# Patient Record
Sex: MALE | Race: WHITE | Employment: FULL TIME | URBAN - METROPOLITAN AREA
[De-identification: names, ages, dates, MRNs, and addresses within clinical notes are randomized per-mention and may not be internally consistent; named-entity substitution may affect disease eponyms.]

---

## 2023-11-19 ENCOUNTER — HOSPITAL ENCOUNTER (OUTPATIENT)
Age: 43
Discharge: HOME | End: 2023-11-19

## 2023-11-19 VITALS
DIASTOLIC BLOOD PRESSURE: 87 MMHG | SYSTOLIC BLOOD PRESSURE: 161 MMHG | OXYGEN SATURATION: 97 % | RESPIRATION RATE: 18 BRPM | HEART RATE: 86 BPM | TEMPERATURE: 97 F

## 2023-11-19 DIAGNOSIS — T78.40XA ALLERGIC REACTION, INITIAL ENCOUNTER: Primary | ICD-10-CM

## 2023-11-19 DIAGNOSIS — R03.0 ELEVATED BLOOD PRESSURE READING: ICD-10-CM

## 2023-11-19 RX ORDER — SODIUM CHLORIDE 9 MG/ML
1000 INJECTION, SOLUTION INTRAVENOUS ONCE
Status: COMPLETED | OUTPATIENT
Start: 2023-11-19 | End: 2023-11-19

## 2023-11-19 RX ORDER — METHYLPREDNISOLONE SODIUM SUCCINATE 125 MG/2ML
125 INJECTION, POWDER, LYOPHILIZED, FOR SOLUTION INTRAMUSCULAR; INTRAVENOUS ONCE
Status: COMPLETED | OUTPATIENT
Start: 2023-11-19 | End: 2023-11-19

## 2023-11-19 RX ORDER — EPINEPHRINE 0.3 MG/.3ML
0.3 INJECTION SUBCUTANEOUS AS NEEDED
Qty: 1 EACH | Refills: 0 | Status: SHIPPED | OUTPATIENT
Start: 2023-11-19

## 2023-11-19 RX ORDER — DIPHENHYDRAMINE HCL 25 MG
CAPSULE ORAL
Qty: 40 CAPSULE | Refills: 0 | Status: SHIPPED | OUTPATIENT
Start: 2023-11-19 | End: 2023-11-23

## 2023-11-19 RX ORDER — PREDNISONE 20 MG/1
40 TABLET ORAL DAILY
Qty: 10 TABLET | Refills: 0 | Status: SHIPPED | OUTPATIENT
Start: 2023-11-19 | End: 2023-11-24

## 2023-11-19 RX ORDER — LORATADINE 10 MG/1
10 TABLET ORAL DAILY
Qty: 10 TABLET | Refills: 0 | Status: SHIPPED | OUTPATIENT
Start: 2023-11-19 | End: 2023-11-29

## 2023-11-19 RX ORDER — DIPHENHYDRAMINE HYDROCHLORIDE 50 MG/ML
25 INJECTION INTRAMUSCULAR; INTRAVENOUS ONCE
Status: COMPLETED | OUTPATIENT
Start: 2023-11-19 | End: 2023-11-19

## 2023-11-19 RX ORDER — FAMOTIDINE 20 MG/1
20 TABLET, FILM COATED ORAL ONCE
Status: COMPLETED | OUTPATIENT
Start: 2023-11-19 | End: 2023-11-19

## 2023-11-19 RX ORDER — FAMOTIDINE 20 MG/1
20 TABLET, FILM COATED ORAL 2 TIMES DAILY
Qty: 10 TABLET | Refills: 0 | Status: SHIPPED | OUTPATIENT
Start: 2023-11-19 | End: 2023-11-24

## 2023-11-19 NOTE — ED INITIAL ASSESSMENT (HPI)
Pt presents with c/o generalized rash beginning about 10 am after taking a medication from [facility] and eating eggs. Denies SOB, chest pain or difficulty swallowing. Respirations even and unlabored, speaking in complete sentences.

## 2024-06-13 ENCOUNTER — HOSPITAL ENCOUNTER (OUTPATIENT)
Age: 44
Discharge: ANOTHER HOSPITAL | End: 2024-06-13

## 2024-06-13 ENCOUNTER — APPOINTMENT (OUTPATIENT)
Dept: GENERAL RADIOLOGY | Facility: HOSPITAL | Age: 44
End: 2024-06-13
Attending: NURSE PRACTITIONER

## 2024-06-13 ENCOUNTER — HOSPITAL ENCOUNTER (EMERGENCY)
Facility: HOSPITAL | Age: 44
Discharge: HOME | End: 2024-06-13

## 2024-06-13 VITALS
TEMPERATURE: 97 F | HEART RATE: 69 BPM | BODY MASS INDEX: 29.59 KG/M2 | WEIGHT: 167 LBS | DIASTOLIC BLOOD PRESSURE: 79 MMHG | RESPIRATION RATE: 18 BRPM | OXYGEN SATURATION: 99 % | HEIGHT: 62.99 IN | SYSTOLIC BLOOD PRESSURE: 133 MMHG

## 2024-06-13 VITALS
DIASTOLIC BLOOD PRESSURE: 88 MMHG | OXYGEN SATURATION: 100 % | TEMPERATURE: 98 F | HEART RATE: 100 BPM | SYSTOLIC BLOOD PRESSURE: 135 MMHG | RESPIRATION RATE: 18 BRPM

## 2024-06-13 DIAGNOSIS — M54.50 ACUTE BILATERAL LOW BACK PAIN WITHOUT SCIATICA: ICD-10-CM

## 2024-06-13 DIAGNOSIS — R07.89 CHEST PAIN, ATYPICAL: Primary | ICD-10-CM

## 2024-06-13 DIAGNOSIS — R20.0 NUMBNESS: ICD-10-CM

## 2024-06-13 DIAGNOSIS — R10.9 LEFT FLANK PAIN: ICD-10-CM

## 2024-06-13 DIAGNOSIS — R42 DIZZINESS: Primary | ICD-10-CM

## 2024-06-13 DIAGNOSIS — R07.9 ACUTE CHEST PAIN: ICD-10-CM

## 2024-06-13 LAB
ANION GAP SERPL CALC-SCNC: 7 MMOL/L (ref 0–18)
BASOPHILS # BLD AUTO: 0.05 X10(3) UL (ref 0–0.2)
BASOPHILS NFR BLD AUTO: 0.5 %
BUN BLD-MCNC: 10 MG/DL (ref 9–23)
BUN/CREAT SERPL: 11.2 (ref 10–20)
CALCIUM BLD-MCNC: 9 MG/DL (ref 8.7–10.4)
CHLORIDE SERPL-SCNC: 105 MMOL/L (ref 98–112)
CO2 SERPL-SCNC: 26 MMOL/L (ref 21–32)
CREAT BLD-MCNC: 0.89 MG/DL
D DIMER PPP FEU-MCNC: 0.37 UG/ML FEU (ref ?–0.5)
DEPRECATED RDW RBC AUTO: 40.5 FL (ref 35.1–46.3)
EGFRCR SERPLBLD CKD-EPI 2021: 108 ML/MIN/1.73M2 (ref 60–?)
EOSINOPHIL # BLD AUTO: 0.35 X10(3) UL (ref 0–0.7)
EOSINOPHIL NFR BLD AUTO: 3.7 %
ERYTHROCYTE [DISTWIDTH] IN BLOOD BY AUTOMATED COUNT: 12.6 % (ref 11–15)
GLUCOSE BLD-MCNC: 95 MG/DL (ref 70–99)
HCT VFR BLD AUTO: 42.9 %
HGB BLD-MCNC: 14.7 G/DL
IMM GRANULOCYTES # BLD AUTO: 0.03 X10(3) UL (ref 0–1)
IMM GRANULOCYTES NFR BLD: 0.3 %
LYMPHOCYTES # BLD AUTO: 2.03 X10(3) UL (ref 1–4)
LYMPHOCYTES NFR BLD AUTO: 21.3 %
MCH RBC QN AUTO: 30.1 PG (ref 26–34)
MCHC RBC AUTO-ENTMCNC: 34.3 G/DL (ref 31–37)
MCV RBC AUTO: 87.7 FL
MONOCYTES # BLD AUTO: 1.1 X10(3) UL (ref 0.1–1)
MONOCYTES NFR BLD AUTO: 11.6 %
NEUTROPHILS # BLD AUTO: 5.96 X10 (3) UL (ref 1.5–7.7)
NEUTROPHILS # BLD AUTO: 5.96 X10(3) UL (ref 1.5–7.7)
NEUTROPHILS NFR BLD AUTO: 62.6 %
OSMOLALITY SERPL CALC.SUM OF ELEC: 285 MOSM/KG (ref 275–295)
PLATELET # BLD AUTO: 274 10(3)UL (ref 150–450)
POTASSIUM SERPL-SCNC: 3.8 MMOL/L (ref 3.5–5.1)
RBC # BLD AUTO: 4.89 X10(6)UL
SODIUM SERPL-SCNC: 138 MMOL/L (ref 136–145)
TROPONIN I SERPL HS-MCNC: 5 NG/L
WBC # BLD AUTO: 9.5 X10(3) UL (ref 4–11)

## 2024-06-13 PROCEDURE — 93000 ELECTROCARDIOGRAM COMPLETE: CPT | Performed by: EMERGENCY MEDICINE

## 2024-06-13 PROCEDURE — 80048 BASIC METABOLIC PNL TOTAL CA: CPT | Performed by: NURSE PRACTITIONER

## 2024-06-13 PROCEDURE — 99284 EMERGENCY DEPT VISIT MOD MDM: CPT

## 2024-06-13 PROCEDURE — 84484 ASSAY OF TROPONIN QUANT: CPT | Performed by: NURSE PRACTITIONER

## 2024-06-13 PROCEDURE — 93010 ELECTROCARDIOGRAM REPORT: CPT

## 2024-06-13 PROCEDURE — 85379 FIBRIN DEGRADATION QUANT: CPT | Performed by: NURSE PRACTITIONER

## 2024-06-13 PROCEDURE — 93005 ELECTROCARDIOGRAM TRACING: CPT

## 2024-06-13 PROCEDURE — 36415 COLL VENOUS BLD VENIPUNCTURE: CPT

## 2024-06-13 PROCEDURE — 72100 X-RAY EXAM L-S SPINE 2/3 VWS: CPT | Performed by: NURSE PRACTITIONER

## 2024-06-13 PROCEDURE — 85025 COMPLETE CBC W/AUTO DIFF WBC: CPT | Performed by: NURSE PRACTITIONER

## 2024-06-13 PROCEDURE — 99215 OFFICE O/P EST HI 40 MIN: CPT | Performed by: EMERGENCY MEDICINE

## 2024-06-13 PROCEDURE — 71045 X-RAY EXAM CHEST 1 VIEW: CPT | Performed by: NURSE PRACTITIONER

## 2024-06-13 RX ORDER — TRAMADOL HYDROCHLORIDE 50 MG/1
TABLET ORAL EVERY 6 HOURS PRN
Qty: 10 TABLET | Refills: 0 | Status: SHIPPED | OUTPATIENT
Start: 2024-06-13 | End: 2024-06-18

## 2024-06-13 RX ORDER — HYDROCODONE BITARTRATE AND ACETAMINOPHEN 5; 325 MG/1; MG/1
1 TABLET ORAL ONCE
Status: COMPLETED | OUTPATIENT
Start: 2024-06-13 | End: 2024-06-13

## 2024-06-13 RX ORDER — METHYLPREDNISOLONE 4 MG/1
TABLET ORAL
Qty: 1 EACH | Refills: 0 | Status: SHIPPED | OUTPATIENT
Start: 2024-06-13

## 2024-06-14 LAB
ATRIAL RATE: 89 BPM
ATRIAL RATE: 90 BPM
P AXIS: -23 DEGREES
P AXIS: -5 DEGREES
P-R INTERVAL: 148 MS
P-R INTERVAL: 162 MS
Q-T INTERVAL: 358 MS
Q-T INTERVAL: 362 MS
QRS DURATION: 88 MS
QRS DURATION: 92 MS
QTC CALCULATION (BEZET): 437 MS
QTC CALCULATION (BEZET): 440 MS
R AXIS: -6 DEGREES
R AXIS: 15 DEGREES
T AXIS: -1 DEGREES
T AXIS: -10 DEGREES
VENTRICULAR RATE: 89 BPM
VENTRICULAR RATE: 90 BPM

## 2024-06-14 NOTE — ED PROVIDER NOTES
Patient Seen in: [facility]      History     Chief Complaint   Patient presents with    Back Pain     Stated Complaint: Numbness    Subjective:   HPI  Jone Caldwell is a 43 year old male who is English-speaking here for multiple complaints including but not limited to numbness down his left leg, left flank pain, left flank numbness, chest numbness, chest pain, and possible shortness of breath.  Symptoms started suddenly while he was laying down.  Denies injury.  No acute distress.       Objective:   History reviewed. No pertinent past medical history.           History reviewed. No pertinent surgical history.             Social History     Socioeconomic History    Marital status:    Tobacco Use    Smoking status: Never    Smokeless tobacco: Never              Review of Systems    Positive for stated complaint: Numbness  Other systems are as noted in HPI.  Constitutional and vital signs reviewed.      All other systems reviewed and negative except as noted above.    Physical Exam     ED Triage Vitals [06/13/24 1822]   /88   Pulse 100   Resp 18   Temp 97.9 °F (36.6 °C)   Temp src Temporal   SpO2 100 %   O2 Device None (Room air)       Current Vitals:   Vital Signs  BP: 135/88  Pulse: 100  Resp: 18  Temp: 97.9 °F (36.6 °C)  Temp src: Temporal    Oxygen Therapy  SpO2: 100 %  O2 Device: None (Room air)            Physical Exam  Vitals and nursing note reviewed.   Constitutional:       General: He is not in acute distress.     Appearance: Normal appearance. He is ill-appearing. He is not toxic-appearing or diaphoretic.   HENT:      Head: Normocephalic.      Right Ear: External ear normal.      Left Ear: External ear normal.      Nose: Nose normal. No congestion.   Eyes:      Conjunctiva/sclera: Conjunctivae normal.      Pupils: Pupils are equal, round, and reactive to light.   Neck:      Comments: No Rigidity, adenopathy, tenderness, bruit, or JVD  Cardiovascular:      Rate and Rhythm:  Regular rhythm. Tachycardia present.      Pulses: Normal pulses.      Comments: Repeat heart rate 102 bpm on exam.  89 on EKG.   Pulmonary:      Effort: Pulmonary effort is normal.   Abdominal:      General: Abdomen is flat.      Palpations: Abdomen is soft.      Tenderness: There is no abdominal tenderness. There is no guarding.      Comments: Pulsating mass (-)   Musculoskeletal:         General: Normal range of motion.      Cervical back: Normal range of motion.   Skin:     Capillary Refill: Capillary refill takes less than 2 seconds.   Neurological:      General: No focal deficit present.      Mental Status: He is alert and oriented to person, place, and time.      Sensory: No sensory deficit.   Psychiatric:         Mood and Affect: Mood normal.         Behavior: Behavior normal.         Thought Content: Thought content normal.         Judgment: Judgment normal.               ED Course   Labs Reviewed - No data to display  EKG    Rate, intervals and axes as noted on EKG Report.  Rate: 89  Rhythm: Sinus Rhythm  Reading: normal                          MDM                                        Medical Decision Making  Ddx; AAA, dissection, ACS, PE, back strain, kidney stones, somatic, radiculopathy.     [facility] [place] [facility] for higher level of care/further evaluation.  Discussed case with patient, wife with  and they agreed to emergency room valuation.  EKG sent with patient.  He is aware to stay NPO.  He is of sound mind with decision-making capabilities.  No suspicion of drugs, alcohol, or altered mental status.  Stable to go by private vehicle.    Problems Addressed:  Chest pain, atypical: acute illness or injury  Left flank pain: acute illness or injury  Numbness: acute illness or injury        Disposition and Plan     Clinical Impression:  1. Chest pain, atypical    2. Left flank pain    3. Numbness         Disposition:  Ic to ed  6/13/2024  7:01  pm    Follow-up:  No follow-up provider specified.        Medications Prescribed:  Discharge Medication List as of 6/13/2024  7:04 PM

## 2024-06-14 NOTE — ED INITIAL ASSESSMENT (HPI)
Pt arrives through triage with       complaints of lower back pain that started yesterday. Pt reports having bilateral foot and hand numbness that has been going on for \"a couple of weeks\" Pt denies HA. Slight dizziness. Pt also reports mild CP.

## 2024-06-14 NOTE — ED PROVIDER NOTES
Patient Seen in: [facility]      History     Chief Complaint   Patient presents with    Back Pain     Stated Complaint: Chest pain, back pain, sent by [facility]    Subjective:   42yo/m w no known medical problems reports with chest and low back pain. On and off. Sometimes tingling. No trouble breathing. No vomiting. No legs swelling. Also reports 2 weeks of on and off hand tingling. No weakness or deficits. Nothing makes better or worse.             Objective:   No pertinent past medical history.            No pertinent past surgical history.              No pertinent social history.            Review of Systems   All other systems reviewed and are negative.      Positive for stated complaint: Chest pain, back pain, sent by [facility]  Other systems are as noted in HPI.  Constitutional and vital signs reviewed.      All other systems reviewed and negative except as noted above.    Physical Exam     ED Triage Vitals [06/13/24 1936]   /89   Pulse 98   Resp 18   Temp 97.4 °F (36.3 °C)   Temp src    SpO2 98 %   O2 Device None (Room air)       Current Vitals:   Vital Signs  BP: 144/89  Pulse: 98  Resp: 18  Temp: 97.4 °F (36.3 °C)  MAP (mmHg): (!) 107    Oxygen Therapy  SpO2: 98 %  O2 Device: None (Room air)            Physical Exam  Vitals and nursing note reviewed.   Constitutional:       General: He is not in acute distress.     Appearance: He is well-developed.   HENT:      Head: Normocephalic and atraumatic.      Nose: Nose normal.      Mouth/Throat:      Mouth: Mucous membranes are moist.   Eyes:      Conjunctiva/sclera: Conjunctivae normal.      Pupils: Pupils are equal, round, and reactive to light.   Cardiovascular:      Rate and Rhythm: Normal rate and regular rhythm.      Heart sounds: Normal heart sounds.   Pulmonary:      Effort: Pulmonary effort is normal.      Breath sounds: Normal breath sounds.   Abdominal:      General: Bowel sounds are normal.      Palpations: Abdomen is  soft.   Musculoskeletal:         General: No tenderness or deformity. Normal range of motion.      Cervical back: Normal range of motion and neck supple.   Skin:     General: Skin is warm and dry.      Capillary Refill: Capillary refill takes less than 2 seconds.      Findings: No rash.      Comments: Normal color   Neurological:      General: No focal deficit present.      Mental Status: He is alert and oriented to person, place, and time.      GCS: GCS eye subscore is 4. GCS verbal subscore is 5. GCS motor subscore is 6.      Cranial Nerves: No cranial nerve deficit.      Gait: Gait normal.               ED Course     Labs Reviewed   CBC W/ DIFFERENTIAL - Abnormal; Notable for the following components:       Result Value    Monocyte Absolute 1.10 (*)     All other components within normal limits   BASIC METABOLIC PANEL (8) - Normal   TROPONIN I HIGH SENSITIVITY - Normal   D-DIMER - Normal   CBC WITH DIFFERENTIAL WITH PLATELET    Narrative:     The following orders were created for panel order CBC With Differential With Platelet.  Procedure                               Abnormality         Status                     ---------                               -----------         ------                     CBC W/ DIFFERENTIAL[[id number]]          Abnormal            Final result                 Please view results for these tests on the individual orders.     EKG    Rate, intervals and axes as noted on EKG Report.  Rate: 90   Rhythm: Sinus Rhythm  Reading: NSR no soraya no ectopy normal axis                 Impression  CONCLUSION:     No acute fracture or malalignment lumbar spine.  Mild chronic appearing compression deformities L1 through L4.     Mild-to-moderate lumbar spine degenerative change.           Dictated by (CST): Danny Chawla MD on 6/13/2024 at 8:27 PM      Finalized by (CST): Danny Chawla MD on 6/13/2024 at 8:28 PM           FINDINGS:  CARDIAC/VASC: Normal.  No cardiac silhouette abnormality or  cardiomegaly.  Unremarkable pulmonary vasculature.  MEDIAST/YOLI:   No visible mass or adenopathy.  LUNGS/PLEURA: Normal.  No significant pulmonary parenchymal abnormalities.  No effusion or pleural thickening.  BONES: No fracture or visible bony lesion.  OTHER: Negative.                Impression  CONCLUSION: No acute cardiopulmonary disease.           Dictated by (CST): Danny Chawla MD on 6/13/2024 at 8:26 PM      Finalized by (CST): Danny Chawla MD on 6/13/2024 at 8:27 PM          MDM                  Medical Decision Making  42yo/m w hx and exam as stated; low back pain, dizziness, chest pain    Dimer neg  Trop neg  Cxr non acute  L spine x chronic compression deformities  Normal neuro exam  EKG non acute  Full rom of all extremities, equal hand grasp, normal dtrs, steady gait  Overall stable    Plan  Dc to home  Close fu with pcp        Amount and/or Complexity of Data Reviewed  Labs:  Decision-making details documented in ED Course.  Radiology:  Decision-making details documented in ED Course.    Risk  OTC drugs.  Prescription drug management.        Disposition and Plan     Clinical Impression:  1. Dizziness    2. Acute chest pain    3. Acute bilateral low back pain without sciatica         Disposition:  Discharge  6/13/2024  9:23 pm    Follow-up:  Wesley Shepherd MD  [facility]  [address] [place] [id number]  [phone]    Follow up in 2 day(s)            Medications Prescribed:  Current Discharge Medication List        START taking these medications    Details   traMADol 50 MG Oral Tab Take 1-2 tablets ( mg total) by mouth every 6 (six) hours as needed for Pain.  Qty: 10 tablet, Refills: 0    Associated Diagnoses: Acute bilateral low back pain without sciatica      methylPREDNISolone (MEDROL) 4 MG Oral Tablet Therapy Pack Dosepack: take as directed  Qty: 1 each, Refills: 0    Associated Diagnoses: Acute bilateral low back pain without sciatica